# Patient Record
Sex: MALE | Race: WHITE | NOT HISPANIC OR LATINO
[De-identification: names, ages, dates, MRNs, and addresses within clinical notes are randomized per-mention and may not be internally consistent; named-entity substitution may affect disease eponyms.]

---

## 2019-09-17 ENCOUNTER — FORM ENCOUNTER (OUTPATIENT)
Age: 74
End: 2019-09-17

## 2019-09-17 PROBLEM — Z00.00 ENCOUNTER FOR PREVENTIVE HEALTH EXAMINATION: Status: ACTIVE | Noted: 2019-09-17

## 2019-09-18 ENCOUNTER — OUTPATIENT (OUTPATIENT)
Dept: OUTPATIENT SERVICES | Facility: HOSPITAL | Age: 74
LOS: 1 days | End: 2019-09-18
Payer: MEDICARE

## 2019-09-18 ENCOUNTER — APPOINTMENT (OUTPATIENT)
Dept: ORTHOPEDIC SURGERY | Facility: CLINIC | Age: 74
End: 2019-09-18
Payer: MEDICARE

## 2019-09-18 DIAGNOSIS — M70.61 TROCHANTERIC BURSITIS, RIGHT HIP: ICD-10-CM

## 2019-09-18 DIAGNOSIS — M48.061 SPINAL STENOSIS, LUMBAR REGION WITHOUT NEUROGENIC CLAUDICATION: ICD-10-CM

## 2019-09-18 PROCEDURE — 99203 OFFICE O/P NEW LOW 30 MIN: CPT | Mod: 25

## 2019-09-18 PROCEDURE — 73521 X-RAY EXAM HIPS BI 2 VIEWS: CPT

## 2019-09-18 PROCEDURE — 20610 DRAIN/INJ JOINT/BURSA W/O US: CPT | Mod: RT

## 2019-09-18 PROCEDURE — 73521 X-RAY EXAM HIPS BI 2 VIEWS: CPT | Mod: 26

## 2019-09-25 NOTE — PHYSICAL EXAM
[de-identified] : General: Not in acute distress, dressed appropriately, sitting on examination table\par Skin: Warm and dry, normal turgor, no rashes\par Neurological: AOx3, Cranial nerves grossly in tact\par Psych: Mood and affect appropriate\par \par 8 Hip: No swelling edema erythema redness or drainage. Tender over the right greater trochanter. ROM: Hip flexion 120, abduction 30, int/ext rotation 45. Painless range of motion. 5/5 strength. Normal gait. [id number] LbaomHepcpho956Lmszs LmzhgSygpkth996Var ZvgarWiwhumq445Fazzf VdzizTkhyxph755Njc SzfytTcceejx533Wjklk \par \par  [de-identified] : X-rays of both hips show moderate arthritic changes, there is decreased joint space superiorly mild to moderately and medially inferiorly moderately to severely.

## 2019-09-25 NOTE — PROCEDURE
[de-identified] : After obtaining verbal consent and under normal sterile conditions the right greater trochanter was injected with 4 cc lidocaine and 1 cc of 40 mg her mL of triamcinolone.

## 2019-09-25 NOTE — HISTORY OF PRESENT ILLNESS
[de-identified] : Here today for initial evaluation of right hip pain. It is lateral and posterior and nonradiating. No groin pain. It is worse with sit-to-stand and start up. It is mild at rest and moderate with activity.

## 2019-09-25 NOTE — ASSESSMENT
[FreeTextEntry1] : Assessment/plan\par Right greater trochanter bursitis and right lower extremity radiculopathy\par \par The right greater trochanter was injected today as described above, he is also going to follow up with his wife with the spine, we'll let the spine team order an MRI if they need it and he will follow up with them in the future. Followup with the hip/knee arthroplasty team p.r.n.\par \par All medical record entries made by the PA/Tammy/Fellow are at my, Dr. Esteban Amador's direction and personally dictated by me on 09/18/2019]. I have reviewed the chart and agree that the record accurately reflects my personal performance of the history, physical exam, assessment, and plan. I have also personally directed reviewed, and agreed with the chart.\par